# Patient Record
Sex: FEMALE | Race: BLACK OR AFRICAN AMERICAN | NOT HISPANIC OR LATINO | Employment: PART TIME | URBAN - METROPOLITAN AREA
[De-identification: names, ages, dates, MRNs, and addresses within clinical notes are randomized per-mention and may not be internally consistent; named-entity substitution may affect disease eponyms.]

---

## 2023-01-31 ENCOUNTER — HOSPITAL ENCOUNTER (EMERGENCY)
Facility: CLINIC | Age: 23
Discharge: HOME | End: 2023-01-31
Attending: EMERGENCY MEDICINE | Admitting: EMERGENCY MEDICINE
Payer: COMMERCIAL

## 2023-01-31 VITALS
HEART RATE: 70 BPM | DIASTOLIC BLOOD PRESSURE: 61 MMHG | TEMPERATURE: 97.9 F | HEIGHT: 62 IN | BODY MASS INDEX: 20.98 KG/M2 | WEIGHT: 114 LBS | RESPIRATION RATE: 18 BRPM | OXYGEN SATURATION: 97 % | SYSTOLIC BLOOD PRESSURE: 110 MMHG

## 2023-01-31 DIAGNOSIS — R55 NEAR SYNCOPE: ICD-10-CM

## 2023-01-31 LAB
ATRIAL RATE - MUSE: 81 BPM
BASOPHILS # BLD AUTO: 0 10E3/UL (ref 0–0.2)
BASOPHILS NFR BLD AUTO: 0 %
DIASTOLIC BLOOD PRESSURE - MUSE: NORMAL MMHG
EOSINOPHIL # BLD AUTO: 0 10E3/UL (ref 0–0.7)
EOSINOPHIL NFR BLD AUTO: 0 %
ERYTHROCYTE [DISTWIDTH] IN BLOOD BY AUTOMATED COUNT: 14.6 % (ref 10–15)
HCG SERPL QL: NEGATIVE
HCT VFR BLD AUTO: 39.2 % (ref 35–47)
HGB BLD-MCNC: 12.5 G/DL (ref 11.7–15.7)
HOLD SPECIMEN: NORMAL
IMM GRANULOCYTES # BLD: 0 10E3/UL
IMM GRANULOCYTES NFR BLD: 0 %
INTERPRETATION ECG - MUSE: NORMAL
LYMPHOCYTES # BLD AUTO: 1.6 10E3/UL (ref 0.8–5.3)
LYMPHOCYTES NFR BLD AUTO: 19 %
MCH RBC QN AUTO: 21.7 PG (ref 26.5–33)
MCHC RBC AUTO-ENTMCNC: 31.9 G/DL (ref 31.5–36.5)
MCV RBC AUTO: 68 FL (ref 78–100)
MONOCYTES # BLD AUTO: 0.5 10E3/UL (ref 0–1.3)
MONOCYTES NFR BLD AUTO: 6 %
NEUTROPHILS # BLD AUTO: 6.3 10E3/UL (ref 1.6–8.3)
NEUTROPHILS NFR BLD AUTO: 75 %
NRBC # BLD AUTO: 0 10E3/UL
NRBC BLD AUTO-RTO: 0 /100
P AXIS - MUSE: 76 DEGREES
PLATELET # BLD AUTO: 277 10E3/UL (ref 150–450)
PR INTERVAL - MUSE: 142 MS
QRS DURATION - MUSE: 82 MS
QT - MUSE: 350 MS
QTC - MUSE: 406 MS
R AXIS - MUSE: 81 DEGREES
RBC # BLD AUTO: 5.76 10E6/UL (ref 3.8–5.2)
SARS-COV-2 RNA RESP QL NAA+PROBE: NEGATIVE
SYSTOLIC BLOOD PRESSURE - MUSE: NORMAL MMHG
T AXIS - MUSE: 73 DEGREES
TROPONIN T SERPL HS-MCNC: <6 NG/L
VENTRICULAR RATE- MUSE: 81 BPM
WBC # BLD AUTO: 8.4 10E3/UL (ref 4–11)

## 2023-01-31 PROCEDURE — C9803 HOPD COVID-19 SPEC COLLECT: HCPCS

## 2023-01-31 PROCEDURE — U0005 INFEC AGEN DETEC AMPLI PROBE: HCPCS | Performed by: EMERGENCY MEDICINE

## 2023-01-31 PROCEDURE — 84703 CHORIONIC GONADOTROPIN ASSAY: CPT | Performed by: EMERGENCY MEDICINE

## 2023-01-31 PROCEDURE — 36415 COLL VENOUS BLD VENIPUNCTURE: CPT | Performed by: EMERGENCY MEDICINE

## 2023-01-31 PROCEDURE — 99284 EMERGENCY DEPT VISIT MOD MDM: CPT | Mod: 25

## 2023-01-31 PROCEDURE — 85025 COMPLETE CBC W/AUTO DIFF WBC: CPT | Performed by: EMERGENCY MEDICINE

## 2023-01-31 PROCEDURE — 258N000003 HC RX IP 258 OP 636: Performed by: EMERGENCY MEDICINE

## 2023-01-31 PROCEDURE — 93005 ELECTROCARDIOGRAM TRACING: CPT | Mod: RTG

## 2023-01-31 PROCEDURE — 96360 HYDRATION IV INFUSION INIT: CPT

## 2023-01-31 PROCEDURE — 84484 ASSAY OF TROPONIN QUANT: CPT | Performed by: EMERGENCY MEDICINE

## 2023-01-31 RX ADMIN — SODIUM CHLORIDE 1000 ML: 9 INJECTION, SOLUTION INTRAVENOUS at 20:07

## 2023-01-31 ASSESSMENT — ENCOUNTER SYMPTOMS
LIGHT-HEADEDNESS: 1
BACK PAIN: 0
DIZZINESS: 1
DYSURIA: 0
VOMITING: 1
COUGH: 0
FEVER: 0
ABDOMINAL PAIN: 0

## 2023-01-31 ASSESSMENT — ACTIVITIES OF DAILY LIVING (ADL): ADLS_ACUITY_SCORE: 35

## 2023-01-31 NOTE — Clinical Note
Kateryna Wang was seen and treated in our emergency department on 1/31/2023.  She may return to work on 02/02/2023.       If you have any questions or concerns, please don't hesitate to call.      Sukhjinder Gan MD

## 2023-02-01 NOTE — ED PROVIDER NOTES
"  History     Chief Complaint:  Syncope       The history is provided by the patient.      Kateryna Wang is a 22 year old female who presents with syncope. The patient reports that about an hour ago, while at work at [employer], she suddenly became dizzy and lightheaded while standing. Patient did not fall or lose consciousness. Patient mentions that this is not the first time she has had these symptoms. She reports that she had to by assisted to a chair by co-workers. Patient denies fever, cough, dysuria, abdominal pain, and back pain. She endorses one episode of vomiting before arrival due to drinking large amounts of liquid in a short amount of time.     Independent Historian: Patient    Review of External Notes: None    ROS:  Review of Systems   Constitutional: Negative for fever.   Respiratory: Negative for cough.    Gastrointestinal: Positive for vomiting. Negative for abdominal pain.   Genitourinary: Negative for dysuria.   Musculoskeletal: Negative for back pain.   Neurological: Positive for dizziness and light-headedness.   All other systems reviewed and are negative.    Allergies:  No Known Allergies     Medications:    Patient denies taking any current medications.    Past Medical History:    Sickle cell trait  Alpha thalassemia trait  Anemia    Social History:  Patient unaccompanied to ED. Works at [employer].  PCP: No Ref-Primary, Physician     Physical Exam     Patient Vitals for the past 24 hrs:   BP Temp Temp src Pulse Resp SpO2 Height Weight   01/31/23 2031 -- -- -- 81 -- -- -- --   01/31/23 1954 106/58 97.9  F (36.6  C) Temporal -- 18 100 % -- --   01/31/23 1949 -- -- -- -- -- -- 1.575 m (5' 2\") 51.7 kg (114 lb)        Physical Exam  Gen: well appearing, in no acute distress  Oral : Mucous membranes moist,   Nose: No rhinorhea  Ears: External near normal, without drainage  Eyes: periorbital tissues and sclera normal   Neck: supple, no abnormal swelling  Lungs: Clear " bilaterally, no tachypnea or distress, speaks full sentences  CV: Regular rate, regular rhythm  Abd: soft, nontender, nondistended, no rebound/guarding  Ext: no lower extremity edema  Skin: warm, dry, well perfused, no rashes/bruising/lesions on exposed skin  Neuro: alert, no gross motor or sensory deficits,   Psych: pleasant mood, normal affect    Emergency Department Course     Laboratory:  Labs Ordered and Resulted from Time of ED Arrival to Time of ED Departure   CBC WITH PLATELETS AND DIFFERENTIAL - Abnormal       Result Value    WBC Count 8.4      RBC Count 5.76 (*)     Hemoglobin 12.5      Hematocrit 39.2      MCV 68 (*)     MCH 21.7 (*)     MCHC 31.9      RDW 14.6      Platelet Count 277      % Neutrophils 75      % Lymphocytes 19      % Monocytes 6      % Eosinophils 0      % Basophils 0      % Immature Granulocytes 0      NRBCs per 100 WBC 0      Absolute Neutrophils 6.3      Absolute Lymphocytes 1.6      Absolute Monocytes 0.5      Absolute Eosinophils 0.0      Absolute Basophils 0.0      Absolute Immature Granulocytes 0.0      Absolute NRBCs 0.0     TROPONIN T, HIGH SENSITIVITY - Normal    Troponin T, High Sensitivity <6     HCG QUALITATIVE PREGNANCY - Normal    hCG Serum Qualitative Negative     COVID-19 VIRUS (CORONAVIRUS) BY PCR   ROUTINE UA WITH MICROSCOPIC REFLEX TO CULTURE        Emergency Department Course & Assessments:     Interventions:  Medications   0.9% sodium chloride BOLUS (1,000 mLs Intravenous New Bag 1/31/23 2007)        Independent Interpretation (X-rays, CTs, rhythm strip):          Social Determinants of Health affecting care:         Assessments:  2050 I spoke with the patient and assessed symptoms.    Disposition:  The patient was discharged to home.     Impression & Plan      Medical Decision Making:  Patient presents emergency department with near syncope at work today.  Reports his current heart in the area where she was at.  This is not unusual for her she reports she  frequently gets dizzy or lightheaded not every day or even every week but its not unusual for her.  Typically she sits down and the feelings past.  She denies any new pain symptoms feels improved being here this evening.  Denies dysuria.  Reviewed labs with her and they are within normal limits.  EKG shows no obvious arrhythmia.  Patient feels comfortable with discharge plan.  We will provide a work excuse for today and tomorrow    Diagnosis:    ICD-10-CM    1. Near syncope  R55            Discharge Medications:  New Prescriptions    No medications on file          Sukhjinder Gan MD  1/31/2023   Sukhjinder Gan,*      Sukhjinder Gan MD  01/31/23 2103

## 2023-02-01 NOTE — ED TRIAGE NOTES
[facility]  ED Arrival Note    Arrives through triage. ABC's intact. A &O X4. . Pt arrives with c/o near syncope at work. Patient reports she suddenly lost vision and felt very weak. She had to be assisted to a chair by co-workers. There was no loss of consciousness. Currently at baseline. Patient also reports feeling depressed over the past a few weeks.        Visitors during triage: None      Triage Interventions: EKG and IV and labs    Ambulatory: Yes    Meets Stroke Criteria?: No    Meets Trauma Criteria?: No    Shock Index: <0.8, for provider reference    Directed to: Triage Lobby    Pronouns: she/her       Triage Assessment     Row Name 01/31/23 1949       Triage Assessment (Adult)    Airway WDL WDL       Respiratory WDL    Respiratory WDL WDL       Skin Circulation/Temperature WDL    Skin Circulation/Temperature WDL WDL       Cardiac WDL    Cardiac WDL WDL       Peripheral/Neurovascular WDL    Peripheral Neurovascular WDL WDL       Cognitive/Neuro/Behavioral WDL    Cognitive/Neuro/Behavioral WDL WDL